# Patient Record
(demographics unavailable — no encounter records)

---

## 2025-03-14 NOTE — ASSESSMENT
[FreeTextEntry1] : JUAN PIERRE is a 81 yo F w tempotral lobe epilepsy.  Stable paln: continue medication unchanged. f/u in 6 months

## 2025-03-14 NOTE — HISTORY OF PRESENT ILLNESS
[FreeTextEntry1] : *** 07/30/2024 ***   Hospital Course 81 yo RH female with PMHx of seizures on Keppra 500mg BID (last seizure in 2018), breast cancer (s/p lumpectomy in 6/2021, followed by radiation, and chemo, currently on Letrozole, no known brain mets), HTN, hypothyroidism, HLD presents as a stroke code with left facial droop, arm weakness, slurred speech, left numbness. Daughter reports patient's grandson heard a thud in patient's room around 17:00 on 7/12/2024 and saw the patient was sitting on bed, but slurring of speech at the time. Last known well 8:00 AM on 7/12/2024. EMS was called and patient brought to the ED. Patient was not able to recall the event and does not know if she fell or hit her head. No symptoms in her legs. Patient has chronic lymphedema in her left arm from previous lumpectomy. As baseline she walks with a cane and daughter has not noticed any cognitive decline other than some mental slowing.  Of note, pt had previous admission in 12/2018 for seizures i/s/o fever as well as patient off Keppra for years (she self d/camacho because she wasn't having seizures). Infectious workup including LP was unrevealing but pt had received empiric meningitis treatment, pt was restarted on Keppra 500mg BID, and the seizures resolved.  Pt used to follow neurologist Dr. Walt Lester but he reportedly retired and pt has not seen him in a while.  Patient was admitted and monitored and was found to have multiple seizure episodes mainly stemming from the right posterior quadrant. She was on Keppra 500 mg bid at home. This was discontinued and she was started on vimpat which was uptitrated to 200 in AM and 250 in PM. Patient otherwise had fever and was found to have infiltrates on chest CT. Medicine consulted and zosyn started. Infiltrates subsequently resolved and patient improved. Antibiotic discontinued. She was evaluated by PT and suggested subacute rehabilitation.

## 2025-04-25 NOTE — PHYSICAL EXAM
Detail Level: Generalized Detail Level: Detailed [Alert] : alert [No Acute Distress] : in no acute distress [Normal Outer Ear/Nose] : the ears and nose were normal in appearance [Normal Appearance] : the appearance of the neck was normal [Supple] : the neck was supple [No Respiratory Distress] : no respiratory distress [No Acc Muscle Use] : no accessory muscle use [Respiration, Rhythm And Depth] : normal respiratory rhythm and effort [Heart Rate And Rhythm] : heart rate was normal and rhythm regular [Auscultation Breath Sounds / Voice Sounds] : lungs were clear to auscultation bilaterally [Bowel Sounds] : normal bowel sounds [Abdomen Tenderness] : non-tender [Abdomen Soft] : soft [No Spinal Tenderness] : no spinal tenderness [Normal Color / Pigmentation] : normal skin color and pigmentation [Normal Turgor] : normal skin turgor [No Focal Deficits] : no focal deficits [Normal Affect] : the affect was normal [Normal Mood] : the mood was normal

## 2025-04-27 NOTE — ASSESSMENT
[FreeTextEntry1] : - ordered repeat blood work to r/o reversible causes - completed HCP - f/u in 1 month or earlier if needed for cognitive evaluation

## 2025-04-27 NOTE — HISTORY OF PRESENT ILLNESS
[0] : 2) Feeling down, depressed, or hopeless: Not at all (0) [PHQ-2 Negative - No further assessment needed] : PHQ-2 Negative - No further assessment needed [No falls in past year] : Patient reported no falls in the past year [Independent] : transferring/mobility [Full assistance needed] : Assistance needed managing medications [] : Assistance needed managing finances. [FreeTextEntry1] : Mrs. Chau is an 81 yo RH female from home lives with her daughter Lizet [phone], walks with a walker. HHA 12 hrs. She also comes with daughter Rosana [phone]. PMHx of seizures on Keppra 500mg BID (last seizure [id number]), breast cancer (s/p lumpectomy in 6/2021, followed by radiation + chemo, currently on Letrozole, no known brain mets), left arm lymphedema, HTN, hypothyroidism, HLD, healing traumatic closed fracture of hip, memory problems, primary localized osteoarthritis of left knee.   Comes to the visit due to memory changes, worse since last summer. She becomes easily forgetful and sometimes does no recognice she is in her home. She had episode of delirium during last hospital admission, she became very agitated. Now her confusion has improved and family is able to redirect her. She wears diapers for urinary incontinence.   Was recently in the hospital due to left facial droop, arm weakness, slurred speech, left numbness. Daughter reports patient's grandson heard a thud in patient's room around 17:00 on 7/12/2024 and saw the patient was sitting on bed but slurring of speech at the time. Last known well 8:00 AM on 7/12/2024.   Of note, pt had previous admission in 12/2018 for seizures in setting of fever as well as patient off Keppra for years (she self d/camacho because she wasn't having seizures). Infectious workup including LP was unrevealing but pt had received empiric meningitis treatment, pt was restarted on Keppra 500mg BID, and the seizures resolved. This was discontinued and she was started on vimpat which was up titrated to 200 in AM and 250 in PM. Patient otherwise had fever and was found to have infiltrates on chest CT.   Has never completed of HCP.   Has 4 children. Lives with daughter Lizet, son Own lives in [place]. Daughter Ernestina lives in [place] and Rosana also lives out of state but calls often.  [[id number]] : 0

## 2025-04-27 NOTE — HISTORY OF PRESENT ILLNESS
[0] : 2) Feeling down, depressed, or hopeless: Not at all (0) [PHQ-2 Negative - No further assessment needed] : PHQ-2 Negative - No further assessment needed [No falls in past year] : Patient reported no falls in the past year [Independent] : transferring/mobility [Full assistance needed] : Assistance needed managing medications [] : Assistance needed managing finances. [FreeTextEntry1] : Mrs. Chau is an 81 yo RH female from home lives with her daughter Lizet [phone], walks with a walker. HHA 12 hrs. She also comes with daughter Rosana [phone]. PMHx of seizures on Keppra 500mg BID (last seizure [id number]), breast cancer (s/p lumpectomy in 6/2021, followed by radiation + chemo, currently on Letrozole, no known brain mets), left arm lymphedema, HTN, hypothyroidism, HLD, healing traumatic closed fracture of hip, memory problems, primary localized osteoarthritis of left knee.   Comes to the visit due to memory changes, worse since last summer. She becomes easily forgetful and sometimes does no recognice she is in her home. She had episode of delirium during last hospital admission, she became very agitated. Now her confusion has improved and family is able to redirect her. She wears diapers for urinary incontinence.   Was recently in the hospital due to left facial droop, arm weakness, slurred speech, left numbness. Daughter reports patient's grandson heard a thud in patient's room around 17:00 on 7/12/2024 and saw the patient was sitting on bed but slurring of speech at the time. Last known well 8:00 AM on 7/12/2024.   Of note, pt had previous admission in 12/2018 for seizures in setting of fever as well as patient off Keppra for years (she self d/camacho because she wasn't having seizures). Infectious workup including LP was unrevealing but pt had received empiric meningitis treatment, pt was restarted on Keppra 500mg BID, and the seizures resolved. This was discontinued and she was started on vimpat which was up titrated to 200 in AM and 250 in PM. Patient otherwise had fever and was found to have infiltrates on chest CT.   Has never completed of HCP.   Has 4 children. Lives with daughter Lizet, son Own lives in [place]. Daughter Ernestina lives in [place] and Rosana also lives out of state but calls often.  [[id number]] : 0

## 2025-06-20 NOTE — HISTORY OF PRESENT ILLNESS
[FreeTextEntry1] : Mrs. Chau is an 79 yo RH female from home lives with her daughter Lizet [phone], walks with a walker. HHA 12 hrs. She also comes with daughter Rosana [phone]. PMHx of seizures on Keppra 500mg BID (last seizure [id number]), breast cancer (s/p lumpectomy in 6/2021, followed by radiation + chemo, currently on Letrozole, no known brain mets), left arm lymphedema, HTN, hypothyroidism, HLD, healing traumatic closed fracture of hip, memory problems, primary localized osteoarthritis of left knee.   Comes to the visit due to memory changes, worse since last summer. She becomes easily forgetful and sometimes does no recognice she is in her home. She had episode of delirium during last hospital admission, she became very agitated. Now her confusion has improved and family is able to redirect her. She wears diapers for urinary incontinence.   Was recently in the hospital due to left facial droop, arm weakness, slurred speech, left numbness. Daughter reports patient's grandson heard a thud in patient's room around 17:00 on 7/12/2024 and saw the patient was sitting on bed but slurring of speech at the time. Last known well 8:00 AM on 7/12/2024.   Of note, pt had previous admission in 12/2018 for seizures in setting of fever as well as patient off Keppra for years (she self d/camacho because she wasn't having seizures). Infectious workup including LP was unrevealing but pt had received empiric meningitis treatment, pt was restarted on Keppra 500mg BID, and the seizures resolved. This was discontinued and she was started on vimpat which was up titrated to 200 in AM and 250 in PM. Patient otherwise had fever and was found to have infiltrates on chest CT.   Has never completed of HCP.   Has 4 children. Lives with daughter Lizet, son Own lives in [place]. Daughter Ernestina lives in [place] and Rosana also lives out of state but calls often.  [No falls in past year] : Patient reported no falls in the past year [Independent] : transferring/mobility [Full assistance needed] : Assistance needed managing medications [] : Assistance needed managing finances. [0] : 2) Feeling down, depressed, or hopeless: Not at all (0) [PHQ-2 Negative - No further assessment needed] : PHQ-2 Negative - No further assessment needed [[id number]] : 0